# Patient Record
Sex: MALE | Race: WHITE
[De-identification: names, ages, dates, MRNs, and addresses within clinical notes are randomized per-mention and may not be internally consistent; named-entity substitution may affect disease eponyms.]

---

## 2017-01-03 ENCOUNTER — HOSPITAL ENCOUNTER (EMERGENCY)
Age: 47
Discharge: HOME | End: 2017-01-03
Payer: MEDICAID

## 2017-01-03 DIAGNOSIS — H66.003: ICD-10-CM

## 2017-01-03 DIAGNOSIS — J10.1: Primary | ICD-10-CM

## 2017-01-03 DIAGNOSIS — F17.200: ICD-10-CM

## 2017-01-03 DIAGNOSIS — I10: ICD-10-CM

## 2017-01-03 PROCEDURE — 87275 INFLUENZA B AG IF: CPT

## 2017-01-03 PROCEDURE — 99283 EMERGENCY DEPT VISIT LOW MDM: CPT

## 2017-01-03 PROCEDURE — 87276 INFLUENZA A AG IF: CPT

## 2017-01-03 PROCEDURE — 99284 EMERGENCY DEPT VISIT MOD MDM: CPT

## 2017-01-03 PROCEDURE — 71020: CPT

## 2017-01-03 PROCEDURE — 94640 AIRWAY INHALATION TREATMENT: CPT

## 2017-01-04 ENCOUNTER — HOSPITAL ENCOUNTER (EMERGENCY)
Age: 47
Discharge: HOME | End: 2017-01-04
Payer: MEDICAID

## 2017-01-04 DIAGNOSIS — Z87.442: ICD-10-CM

## 2017-01-04 DIAGNOSIS — I10: ICD-10-CM

## 2017-01-04 DIAGNOSIS — F17.200: ICD-10-CM

## 2017-01-04 DIAGNOSIS — K21.9: ICD-10-CM

## 2017-01-04 DIAGNOSIS — E86.0: Primary | ICD-10-CM

## 2017-01-04 DIAGNOSIS — J10.1: ICD-10-CM

## 2017-01-04 DIAGNOSIS — G57.72: ICD-10-CM

## 2017-01-04 PROCEDURE — 96374 THER/PROPH/DIAG INJ IV PUSH: CPT

## 2017-01-04 PROCEDURE — 99284 EMERGENCY DEPT VISIT MOD MDM: CPT

## 2017-01-04 PROCEDURE — 96376 TX/PRO/DX INJ SAME DRUG ADON: CPT

## 2017-01-04 PROCEDURE — 96375 TX/PRO/DX INJ NEW DRUG ADDON: CPT

## 2017-01-04 PROCEDURE — 99283 EMERGENCY DEPT VISIT LOW MDM: CPT

## 2017-01-31 ENCOUNTER — HOSPITAL ENCOUNTER (EMERGENCY)
Age: 47
Discharge: HOME | End: 2017-01-31
Payer: MEDICAID

## 2017-01-31 DIAGNOSIS — I10: ICD-10-CM

## 2017-01-31 DIAGNOSIS — F17.200: ICD-10-CM

## 2017-01-31 DIAGNOSIS — G90.522: Primary | ICD-10-CM

## 2017-01-31 PROCEDURE — 99283 EMERGENCY DEPT VISIT LOW MDM: CPT

## 2017-01-31 PROCEDURE — 96376 TX/PRO/DX INJ SAME DRUG ADON: CPT

## 2017-01-31 PROCEDURE — 96374 THER/PROPH/DIAG INJ IV PUSH: CPT

## 2017-01-31 PROCEDURE — 96375 TX/PRO/DX INJ NEW DRUG ADDON: CPT

## 2017-01-31 PROCEDURE — 99284 EMERGENCY DEPT VISIT MOD MDM: CPT

## 2017-02-01 ENCOUNTER — HOSPITAL ENCOUNTER (EMERGENCY)
Age: 47
Discharge: HOME | End: 2017-02-01
Payer: MEDICAID

## 2017-02-01 DIAGNOSIS — F17.200: ICD-10-CM

## 2017-02-01 DIAGNOSIS — G90.50: Primary | ICD-10-CM

## 2017-02-01 DIAGNOSIS — I10: ICD-10-CM

## 2017-02-01 PROCEDURE — 99283 EMERGENCY DEPT VISIT LOW MDM: CPT

## 2017-02-01 PROCEDURE — 99282 EMERGENCY DEPT VISIT SF MDM: CPT

## 2017-02-04 ENCOUNTER — HOSPITAL ENCOUNTER (EMERGENCY)
Age: 47
Discharge: HOME | End: 2017-02-04
Payer: MEDICAID

## 2017-05-03 ENCOUNTER — HOSPITAL ENCOUNTER (EMERGENCY)
Dept: HOSPITAL 21 - SED | Age: 47
LOS: 1 days | Discharge: HOME | End: 2017-05-04
Payer: COMMERCIAL

## 2017-05-03 VITALS
RESPIRATION RATE: 20 BRPM | DIASTOLIC BLOOD PRESSURE: 106 MMHG | OXYGEN SATURATION: 100 % | SYSTOLIC BLOOD PRESSURE: 176 MMHG | HEART RATE: 85 BPM

## 2017-05-03 VITALS
SYSTOLIC BLOOD PRESSURE: 157 MMHG | DIASTOLIC BLOOD PRESSURE: 90 MMHG | RESPIRATION RATE: 16 BRPM | HEART RATE: 85 BPM | OXYGEN SATURATION: 98 %

## 2017-05-03 VITALS — BODY MASS INDEX: 37.88 KG/M2 | HEIGHT: 71 IN | WEIGHT: 270.55 LBS

## 2017-05-03 DIAGNOSIS — R07.89: Primary | ICD-10-CM

## 2017-05-03 DIAGNOSIS — R61: ICD-10-CM

## 2017-05-03 DIAGNOSIS — Z88.8: ICD-10-CM

## 2017-05-03 DIAGNOSIS — F32.9: ICD-10-CM

## 2017-05-03 DIAGNOSIS — R42: ICD-10-CM

## 2017-05-03 DIAGNOSIS — I10: ICD-10-CM

## 2017-05-03 DIAGNOSIS — K21.9: ICD-10-CM

## 2017-05-03 DIAGNOSIS — R06.02: ICD-10-CM

## 2017-05-03 LAB
BASOPHILS % (AUTO): 0.2 % (ref 0–3)
EOSINOPHILS % (AUTO): 3.3 % (ref 0–5)
MAGNESIUM: 2 MG/DL (ref 1.6–2.6)
MCH RBC QN AUTO: 30.4 PG (ref 27–35)
MEAN CORPUSCULAR VOLUME: 85.9 FL (ref 81–100)
MONOCYTES % (AUTO): 9.2 % (ref 4–12)
NEUTROPHILS NFR BLD AUTO: 48.4 % (ref 40–74)
PLATELET COUNT: 173 BIL/L (ref 150–400)
TROPONIN T SERPL-MCNC: 0.01 UG/L (ref 0–0.01)

## 2017-05-03 PROCEDURE — 83735 ASSAY OF MAGNESIUM: CPT

## 2017-05-03 PROCEDURE — 80053 COMPREHEN METABOLIC PANEL: CPT

## 2017-05-03 PROCEDURE — 84484 ASSAY OF TROPONIN QUANT: CPT

## 2017-05-03 PROCEDURE — 85025 COMPLETE CBC W/AUTO DIFF WBC: CPT

## 2017-05-03 PROCEDURE — 36415 COLL VENOUS BLD VENIPUNCTURE: CPT

## 2017-05-03 PROCEDURE — 99285 EMERGENCY DEPT VISIT HI MDM: CPT

## 2017-05-03 PROCEDURE — 71010: CPT

## 2017-05-03 PROCEDURE — 96376 TX/PRO/DX INJ SAME DRUG ADON: CPT

## 2017-05-03 PROCEDURE — 85378 FIBRIN DEGRADE SEMIQUANT: CPT

## 2017-05-03 PROCEDURE — 93005 ELECTROCARDIOGRAM TRACING: CPT

## 2017-05-03 PROCEDURE — 96374 THER/PROPH/DIAG INJ IV PUSH: CPT

## 2017-05-03 NOTE — ED.REPORT
HPI-Chest Pain 40 and Over


Date of Service


May 3, 2017


 ED Provider: Bebo Zavala MD


Pt is a 46 y.o. male with a hx of complex regional pain syndrome and HTN who 

presents to the ED c/o left-sided chest pain and pressure onset 1 hour prior to 

arrival. Pt reports associated diaphoresis, dizziness, and SOB. He states that 

he felt his left Achilles tendon "pop" and that is what proceeded the chest 

pain. Pt reports taking 5mg Valium 4-5 hours prior to arrival. Pt has a hx of 

cardiac catheterization but has no hx of MI.


Nursing Notes


Stated Complaint:  CHEST PAIN


Chief Complaint:  Chest Pain


Nursing Notes Reviewed:  Yes


Allergies:  


Coded Allergies:  


     NSAIDS (Non-Steroidal Anti-Inflamma (Verified  Allergy, Unknown, 5/3/17)


     diphenhydramine (Verified  Allergy, Unknown, 5/3/17)


     gabapentin (Verified  Allergy, Unknown, 5/3/17)


Scheduled


Levetiracetam (Keppra) 500 Mg Tablet 500 MG PO BID 


Omeprazole (Omeprazole) 20 Mg Tablet.dr 20 MG PO BID 





Miscellaneous Medications


([Lisinopril])   


([Buspirone])   


([Amitriptyline])   





General


Time Seen by MD:  21:14





Chief Complaint


Chest pain, Chest pressure


Hx Obtained From:  Patient


Arrived By:  Walk-in


Sudden in Onset?:  Yes


Onset Occurred:  1 - 4 hours ago


Symptom Duration:  Since onset


Location:  : Chest left


Quality:  Painful, Pressure


Radiation:  : Does not radiate


Severity: Current:  Moderate


Severity: Maximum:  Severe


Similar Sx Previous:  Yes





Past Medical History


Past Medical History





complex regional pain sydrome


Head injury 2001


Reports: GERD, Hypertension


Reports: Depression, Ureterolithiasis





Past Surgical History





Cardiac cath


Reports: Back/neck surgery





Smoking History


Unknown if Ever Smoker





Social History


Other Social History:  Good social support





Ambulatory Status


Independent





Review of Systems


Respiratory:  Reports: Shortness of breath


Cardiovascular:  Reports: Chest pain


Musculoskeletal:  Reports: Extremity pain (Left lower extremity)


Skin:  Reports Diaphoresis


Neurologic:  Reports: Dizziness


Complete sys rev & neg:  except as marked.





Physical Exam


Initial Vital Signs





 Vital Signs (First)








  Date Time  Temp Pulse Resp B/P Pulse Ox O2 Delivery O2 Flow Rate FiO2


 


5/3/17 20:53 36.0 85 20 176/106 100 Room Air  








Initial VS:  Reviewed, Vital signs abnormal


    Head / Eyes:  Atraumatic, Normocephalic


    Extremities:  Vascular intact, Neuro intact


    Skin:  Warm, Dry, No cyanosis


    Neurologic:  Alert, Oriented, Nonfocal


    Psychiatric:  Mood/affect normal, Behavior normal, Normal thought content


General/Constitutional:  Awake, Alert, Well appearing, Well developed, Well 

hydrated, Well nourished, Not toxic appearing


Behavior:  Positive: Anxious





Pt appears shaky


Respiratory / Chest:  Atraumatic, Breath sounds NL, Breath sounds = bilat, No 

respiratory distress


Cardiovascular:  Heart rate NL, Regular rhythm, Heart sounds NL, Peripheral 

circulation NL





No lower extremity edema


Abdomen:  Atraumatic, Soft, Non-tender, No guarding, No rebound, No distention


Neck:  Atraumatic, Supple, No JVD


Lower Extremity / Pelvis / MS:  Atraumatic, No deformity, Neurologic intact, 

Vascular intact


Left Leg / Calf:  Positive: Tenderness present... (over Achilles), 


   Negative: Ecchymosis present, Erythema present, Swelling present..., Warmth 

present





Scarring over distal Achilles, no acute changes


Atrophy of left calf vs. right


Skin:  Atraumatic, Color NL, No rash, Warm, Intact


Color / Condition:  Positive: Diaphoresis present





Interpretation & Diagnostics


Lab Results Interpretation


Result Diagram:  


5/3/17 2224                                                                    

            5/3/17 2150














Test


  5/3/17


21:50 5/3/17


22:24 5/3/17


22:25 5/4/17


00:55


 


Sodium Level


  142mEq/L


(134-144) 


  


  


 


 


Potassium Level


  4.2mEq/L


(3.5-5.2) 


  


  


 


 


Chloride Level


  103mEq/L


() 


  


  


 


 


Carbon Dioxide Level


  24mmol/L


(18-29) 


  


  


 


 


Blood Urea Nitrogen 7mg/dL (6-24)    


 


Creatinine


  0.72mg/dL


(0.76-1.27) 


  


  


 


 


Estimat Glomerular Filtration


Rate 124mL/min


(>59) 


  


  


 


 


Glucose Level


  187mg/dL


(60-99) 


  


  


 


 


Calcium Level


  9.0mg/dL


(8.5-10.1) 


  


  


 


 


Magnesium Level


  2.0mg/dL


(1.6-2.6) 


  


  


 


 


Total Bilirubin


  0.2mg/dL


(0.0-1.2) 


  


  


 


 


Aspartate Amino Transf


(AST/SGOT) 40U/L (0-50) 


  


  


  


 


 


Alanine Aminotransferase


(ALT/SGPT) 59U/L (0-44) 


  


  


  


 


 


Alkaline Phosphatase


  102U/L


() 


  


  


 


 


Total Protein


  6.7g/dL


(6.4-8.4) 


  


  


 


 


Albumin


  4.3g/dL


(3.4-5.0) 


  


  


 


 


White Blood Count


  


  6.0th/mm3


(3.8-10.1) 


  


 


 


Red Blood Count


  


  5.26mil/mm3


(4.40-5.80) 


  


 


 


Hemoglobin


  


  16.0g/dL


(13.8-17.2) 


  


 


 


Hematocrit


  


  45.2%


(41.0-50.0) 


  


 


 


Mean Corpuscular Volume


  


  85.9fL


() 


  


 


 


Mean Corpuscular Hemoglobin


  


  30.4pg


(27.0-35.0) 


  


 


 


Mean Corpuscular Hemoglobin


Concent 


  35.4%


(32.0-37.0) 


  


 


 


Red Cell Distribution Width


  


  12.5%


(12.3-15.4) 


  


 


 


Platelet Count


  


  173bil/L


(150-400) 


  


 


 


Neutrophils (%) (Auto)  48.4% (40-74)   


 


Lymphocytes (%) (Auto)  38.7% (14-46)   


 


Monocytes (%) (Auto)  9.2% (4-12)   


 


Eosinophils (%) (Auto)  3.3% (0-5)   


 


Basophils (%) (Auto)  0.2% (0-3)   


 


D-Dimer


  


  


  < 0.50mg/L FEU


(<0.50) 


 


 


Hold Blue Top Tube


  


  


  Received


(Received) 


 


 


Hold Tiger Top Tube


  


  


  Received


(Received) 


 


 


Hold Grey Top Tube


  


  


  Received


(Received) 


 


 


Troponin T


  


  


  


  0.010ug/L


(0.0-0.011)








Lab values outside NL range:  no clinical significance.


Lab Results Interpretation:  


Troponin negative 2, d-dimer negative





ECG Interpretation





   ECG Interpretation:  


LVH


   Time:  21:05


   Interpreted by:  ED physician


   Normal ECG Interpretation:  Normal rate (85), Normal sinus rhythm, No change 

from prior ECGs (7/28/16)








   Time:  22:41


   Interpreted by:  ED physician


   Normal ECG Interpretation:  Normal rate (76), Normal sinus rhythm


   Repeat ECG:  Repeat ECG unchanged





X-Ray Chest Interpretation





   Chest Xray Interpretation:  


IMPRESSION: No acute cardiopulmonary disease


   Interpretation / Wet Read by:  Wet read ED physician





Re-Eval/Medical Decision


Med Decision/Clinical Course


46-year-old male with chest pain and a significant anxiety response to it.  

Emergency room evaluation to include negative troponin 2, negative EKG 2, 

negative d-dimer, and negative chest x-ray.  He does not appear to have serious 

disease at this time.  He is much improved, including no further chest pain and 

no anxiety response.


Source of Hx:  Old records





   Time of Eval:  22:38


   Re-Evaluation/Progress Note:  


Pt is reporting that his chest pressure and diaphoresis has worsened.








   Time of Eval:  22:45


   Re-Evaluation/Progress Note:  


Pt rechecked. He states his sx do not feel like anxiety.


He reports that the [place] stopped the chest pain immediately but has


since returned.


Discussed need for D-dimer, pt understands and agrees with plan.








   Time of Eval:  00:48


   Re-Evaluation/Progress Note:  


Pt rechecked. Pt feels improved.


Discussed need for repeat troponin, pt understands and agrees with plan.


Counseled Regarding:  Diagnosis, Lab results, Need for follow-up, When/why to 

return to ED





Discharge & Departure





 Primary Impression:  


 Chest pain with low risk for cardiac etiology


Disposition:  Home


Discharge Condition


All VS Reviewed:  Yes


Condition:  Improved


Patient Instructions:  Chest Pain (ED)





Additional Instructions:


There is no evidence of serious heart or lung disease at this time.  Chest x-

ray is normal, EKG is normal 2, troponin heart enzyme test is negative 2, d-

dimer lung blood clot test is normal, and all the other labs look okay.  I 

think at least part of your symptoms were due to anxiety.  I think it safe for 

you to go home.  Follow-up with your regular doctor as needed.  Return here if 

you worsen or call me at [phone] between 9 PM and 6 AM for the next couple 

nights if you have any questions.


Referrals:  


Hue Aguila DO (PCP)


Camelia Attestation


Portions of this note were transcribed by Zach Sylvester. I, Dr. Zavala 

personally performed the history, physical exam and medical decision-making; I 

reviewed and confirmed the accuracy of the information in the transcribed note. 

Signed by: Camelia Naidu, 05/04/17 and 0145.


copies to:   Hue Aguila Howard L MD May 3, 2017 21:24


ZACH SYLVESTER May 3, 2017 21:27

## 2017-05-04 VITALS
RESPIRATION RATE: 25 BRPM | HEART RATE: 66 BPM | SYSTOLIC BLOOD PRESSURE: 157 MMHG | DIASTOLIC BLOOD PRESSURE: 96 MMHG | OXYGEN SATURATION: 96 %

## 2017-05-04 NOTE — DRSVH
PROCEDURE:  X-RAY CHEST ONE VIEW, PORTABLE ([id number])

 

INDICATIONS:  CP

 

TECHNIQUE:  One view of the chest was acquired.  

 

COMPARISON:  [facility], CR, XR CHEST 1VW (PORTABLE), 7/28/2016, 1:00.

 

FINDINGS:  

 

Surgical changes and devices:  None.  

 

Lungs and pleura:  No pleural effusions or pneumothorax.  Lungs are clear.  

 

Mediastinum:  Mediastinal contours appear normal.  Heart size is normal.  

 

Bones and chest wall:  No suspicious bony lesions.  Overlying soft tissues appear unremarkable.  

 

IMPRESSION:  No acute cardiopulmonary disease.  

 

 

 

Dictated by:  Karthik Jolly [facility] Interpreted: Emre Liao MD on 5/04/2017 at 8:51   

Transcribed by:  GERTRUDIS on 5/04/2017 at 8:51    

Approved by:  Emre Liao M.D. on 5/04/2017 at 9:42

## 2017-05-05 ENCOUNTER — HOSPITAL ENCOUNTER (EMERGENCY)
Dept: HOSPITAL 21 - SED | Age: 47
Discharge: HOME | End: 2017-05-05
Payer: COMMERCIAL

## 2017-05-05 ENCOUNTER — HOSPITAL ENCOUNTER (EMERGENCY)
Dept: HOSPITAL 76 - ED | Age: 47
Discharge: HOME | End: 2017-05-05
Payer: MEDICAID

## 2017-05-05 VITALS
RESPIRATION RATE: 16 BRPM | OXYGEN SATURATION: 98 % | DIASTOLIC BLOOD PRESSURE: 96 MMHG | SYSTOLIC BLOOD PRESSURE: 153 MMHG | HEART RATE: 99 BPM

## 2017-05-05 VITALS — WEIGHT: 270.57 LBS | HEIGHT: 71 IN | BODY MASS INDEX: 37.88 KG/M2

## 2017-05-05 VITALS — DIASTOLIC BLOOD PRESSURE: 100 MMHG | SYSTOLIC BLOOD PRESSURE: 167 MMHG

## 2017-05-05 DIAGNOSIS — Z88.8: ICD-10-CM

## 2017-05-05 DIAGNOSIS — M79.672: ICD-10-CM

## 2017-05-05 DIAGNOSIS — E78.5: ICD-10-CM
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
## 2017-05-05 DIAGNOSIS — I10: ICD-10-CM

## 2017-05-05 DIAGNOSIS — Z87.442: ICD-10-CM

## 2017-05-05 DIAGNOSIS — K21.9: ICD-10-CM
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
## 2017-05-05 DIAGNOSIS — S86.092A: Primary | ICD-10-CM
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
## 2017-05-05 DIAGNOSIS — F17.200: ICD-10-CM
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
## 2017-05-05 DIAGNOSIS — M76.62: Primary | ICD-10-CM
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
## 2017-05-05 PROCEDURE — 99283 EMERGENCY DEPT VISIT LOW MDM: CPT

## 2017-05-05 PROCEDURE — 99282 EMERGENCY DEPT VISIT SF MDM: CPT
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
## 2017-09-19 ENCOUNTER — HOSPITAL ENCOUNTER (EMERGENCY)
Dept: HOSPITAL 76 - ED | Age: 47
Discharge: HOME | End: 2017-09-19
Payer: MEDICAID

## 2017-09-19 VITALS — DIASTOLIC BLOOD PRESSURE: 79 MMHG | SYSTOLIC BLOOD PRESSURE: 142 MMHG
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
## 2017-09-19 DIAGNOSIS — R07.9: Primary | ICD-10-CM
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
## 2017-09-19 DIAGNOSIS — Z87.891: ICD-10-CM

## 2017-09-19 DIAGNOSIS — M54.5: ICD-10-CM
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
## 2017-09-19 PROCEDURE — 99283 EMERGENCY DEPT VISIT LOW MDM: CPT

## 2017-09-19 PROCEDURE — 93005 ELECTROCARDIOGRAM TRACING: CPT

## 2017-09-19 PROCEDURE — 72100 X-RAY EXAM L-S SPINE 2/3 VWS: CPT

## 2017-09-19 PROCEDURE — 71020: CPT

## 2017-11-30 ENCOUNTER — HOSPITAL ENCOUNTER (OUTPATIENT)
Dept: HOSPITAL 76 - EMS | Age: 47
End: 2017-11-30
Attending: SURGERY
Payer: MEDICAID

## 2017-11-30 DIAGNOSIS — Z03.89: Primary | ICD-10-CM

## 2018-02-13 ENCOUNTER — HOSPITAL ENCOUNTER (OUTPATIENT)
Dept: HOSPITAL 76 - EMS | Age: 48
Discharge: ANOTHER HOSPITAL | End: 2018-02-13
Attending: SURGERY
Payer: MEDICAID

## 2018-02-13 ENCOUNTER — HOSPITAL ENCOUNTER (EMERGENCY)
Dept: HOSPITAL 76 - ED | Age: 48
Discharge: HOME | End: 2018-02-13
Payer: MEDICAID

## 2018-02-13 VITALS — SYSTOLIC BLOOD PRESSURE: 158 MMHG | DIASTOLIC BLOOD PRESSURE: 97 MMHG

## 2018-02-13 DIAGNOSIS — M54.5: Primary | ICD-10-CM
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
## 2018-02-13 DIAGNOSIS — Z87.891: ICD-10-CM

## 2018-02-13 DIAGNOSIS — M25.512: ICD-10-CM

## 2018-02-13 DIAGNOSIS — I10: ICD-10-CM
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
## 2018-02-13 DIAGNOSIS — S70.02XA: ICD-10-CM

## 2018-02-13 DIAGNOSIS — S20.212A: Primary | ICD-10-CM
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
## 2018-02-13 DIAGNOSIS — K21.9: ICD-10-CM
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
## 2018-02-13 DIAGNOSIS — Z87.442: ICD-10-CM

## 2018-02-13 PROCEDURE — 99283 EMERGENCY DEPT VISIT LOW MDM: CPT

## 2018-02-13 PROCEDURE — 71046 X-RAY EXAM CHEST 2 VIEWS: CPT

## 2018-02-20 ENCOUNTER — HOSPITAL ENCOUNTER (EMERGENCY)
Dept: HOSPITAL 76 - ED | Age: 48
Discharge: HOME | End: 2018-02-20
Payer: MEDICAID

## 2018-02-20 VITALS — DIASTOLIC BLOOD PRESSURE: 93 MMHG | SYSTOLIC BLOOD PRESSURE: 131 MMHG

## 2018-02-20 DIAGNOSIS — M54.30: Primary | ICD-10-CM

## 2018-02-20 DIAGNOSIS — I10: ICD-10-CM

## 2018-02-20 PROCEDURE — 99283 EMERGENCY DEPT VISIT LOW MDM: CPT

## 2018-02-20 PROCEDURE — 72131 CT LUMBAR SPINE W/O DYE: CPT

## 2018-04-17 ENCOUNTER — HOSPITAL ENCOUNTER (OUTPATIENT)
Dept: HOSPITAL 76 - EMS | Age: 48
Discharge: ANOTHER HOSPITAL | End: 2018-04-17
Attending: SURGERY
Payer: MEDICAID

## 2018-04-17 ENCOUNTER — HOSPITAL ENCOUNTER (EMERGENCY)
Dept: HOSPITAL 76 - ED | Age: 48
Discharge: ANOTHER HOSPITAL | End: 2018-04-17
Payer: MEDICAID

## 2018-04-17 VITALS — SYSTOLIC BLOOD PRESSURE: 138 MMHG | DIASTOLIC BLOOD PRESSURE: 87 MMHG

## 2018-04-17 DIAGNOSIS — R15.9: Primary | ICD-10-CM

## 2018-04-17 DIAGNOSIS — R32: ICD-10-CM

## 2018-04-17 DIAGNOSIS — G83.4: Primary | ICD-10-CM

## 2018-04-17 DIAGNOSIS — I10: ICD-10-CM

## 2018-04-17 DIAGNOSIS — M54.5: ICD-10-CM

## 2018-04-17 LAB
ALBUMIN DIAFP-MCNC: 4.6 G/DL (ref 3.2–5.5)
ALBUMIN/GLOB SERPL: 1.6 {RATIO} (ref 1–2.2)
ALP SERPL-CCNC: 61 IU/L (ref 42–121)
ALT SERPL W P-5'-P-CCNC: 31 IU/L (ref 10–60)
ANION GAP SERPL CALCULATED.4IONS-SCNC: 7 MMOL/L (ref 6–13)
AST SERPL W P-5'-P-CCNC: 25 IU/L (ref 10–42)
BASOPHILS NFR BLD AUTO: 0 10^3/UL (ref 0–0.1)
BASOPHILS NFR BLD AUTO: 0.3 %
BILIRUB BLD-MCNC: 0.7 MG/DL (ref 0.2–1)
BUN SERPL-MCNC: 9 MG/DL (ref 6–20)
CALCIUM UR-MCNC: 9.1 MG/DL (ref 8.5–10.3)
CHLORIDE SERPL-SCNC: 104 MMOL/L (ref 101–111)
CO2 SERPL-SCNC: 24 MMOL/L (ref 21–32)
CREAT SERPLBLD-SCNC: 0.7 MG/DL (ref 0.6–1.2)
EOSINOPHIL # BLD AUTO: 0 10^3/UL (ref 0–0.7)
EOSINOPHIL NFR BLD AUTO: 0.2 %
ERYTHROCYTE [DISTWIDTH] IN BLOOD BY AUTOMATED COUNT: 12.8 % (ref 12–15)
GFRSERPLBLD MDRD-ARVRAT: 120 ML/MIN/{1.73_M2} (ref 89–?)
GLOBULIN SER-MCNC: 2.9 G/DL (ref 2.1–4.2)
GLUCOSE SERPL-MCNC: 107 MG/DL (ref 70–100)
HGB UR QL STRIP: 15.9 G/DL (ref 14–18)
LIPASE SERPL-CCNC: 20 U/L (ref 22–51)
LYMPHOCYTES # SPEC AUTO: 1.8 10^3/UL (ref 1.5–3.5)
LYMPHOCYTES NFR BLD AUTO: 18.5 %
MCH RBC QN AUTO: 30.4 PG (ref 27–31)
MCHC RBC AUTO-ENTMCNC: 34.1 G/DL (ref 32–36)
MCV RBC AUTO: 89.3 FL (ref 80–94)
MONOCYTES # BLD AUTO: 0.4 10^3/UL (ref 0–1)
MONOCYTES NFR BLD AUTO: 4.4 %
NEUTROPHILS # BLD AUTO: 7.5 10^3/UL (ref 1.5–6.6)
NEUTROPHILS # SNV AUTO: 9.8 X10^3/UL (ref 4.8–10.8)
NEUTROPHILS NFR BLD AUTO: 76.6 %
PDW BLD AUTO: 8.1 FL (ref 7.4–11.4)
PLATELET # BLD: 183 10^3/UL (ref 130–450)
PROT SPEC-MCNC: 7.5 G/DL (ref 6.7–8.2)
RBC MAR: 5.24 10^6/UL (ref 4.7–6.1)
SODIUM SERPLBLD-SCNC: 135 MMOL/L (ref 135–145)

## 2018-04-17 PROCEDURE — 99283 EMERGENCY DEPT VISIT LOW MDM: CPT

## 2018-04-17 PROCEDURE — 83690 ASSAY OF LIPASE: CPT

## 2018-04-17 PROCEDURE — 96361 HYDRATE IV INFUSION ADD-ON: CPT

## 2018-04-17 PROCEDURE — 80053 COMPREHEN METABOLIC PANEL: CPT

## 2018-04-17 PROCEDURE — 36415 COLL VENOUS BLD VENIPUNCTURE: CPT

## 2018-04-17 PROCEDURE — 96375 TX/PRO/DX INJ NEW DRUG ADDON: CPT

## 2018-04-17 PROCEDURE — 85025 COMPLETE CBC W/AUTO DIFF WBC: CPT

## 2018-04-17 PROCEDURE — 99284 EMERGENCY DEPT VISIT MOD MDM: CPT

## 2018-04-17 PROCEDURE — 96374 THER/PROPH/DIAG INJ IV PUSH: CPT

## 2018-04-17 PROCEDURE — 51798 US URINE CAPACITY MEASURE: CPT

## 2018-05-13 ENCOUNTER — HOSPITAL ENCOUNTER (EMERGENCY)
Dept: HOSPITAL 76 - ED | Age: 48
Discharge: HOME | End: 2018-05-13
Payer: MEDICAID

## 2018-05-13 VITALS — SYSTOLIC BLOOD PRESSURE: 133 MMHG | DIASTOLIC BLOOD PRESSURE: 93 MMHG

## 2018-05-13 DIAGNOSIS — I10: ICD-10-CM

## 2018-05-13 DIAGNOSIS — E86.0: ICD-10-CM

## 2018-05-13 DIAGNOSIS — G90.50: ICD-10-CM

## 2018-05-13 DIAGNOSIS — J06.9: ICD-10-CM

## 2018-05-13 DIAGNOSIS — H66.002: Primary | ICD-10-CM

## 2018-05-13 LAB
ALBUMIN DIAFP-MCNC: 4.2 G/DL (ref 3.2–5.5)
ALBUMIN/GLOB SERPL: 1.4 {RATIO} (ref 1–2.2)
ALP SERPL-CCNC: 62 IU/L (ref 42–121)
ALT SERPL W P-5'-P-CCNC: 30 IU/L (ref 10–60)
ANION GAP SERPL CALCULATED.4IONS-SCNC: 7 MMOL/L (ref 6–13)
AST SERPL W P-5'-P-CCNC: 29 IU/L (ref 10–42)
BASOPHILS NFR BLD AUTO: 0 10^3/UL (ref 0–0.1)
BASOPHILS NFR BLD AUTO: 0.2 %
BILIRUB BLD-MCNC: 0.9 MG/DL (ref 0.2–1)
BUN SERPL-MCNC: 14 MG/DL (ref 6–20)
CALCIUM UR-MCNC: 8.9 MG/DL (ref 8.5–10.3)
CHLORIDE SERPL-SCNC: 101 MMOL/L (ref 101–111)
CLARITY UR REFRACT.AUTO: CLEAR
CO2 SERPL-SCNC: 25 MMOL/L (ref 21–32)
CREAT SERPLBLD-SCNC: 0.8 MG/DL (ref 0.6–1.2)
EOSINOPHIL # BLD AUTO: 0.1 10^3/UL (ref 0–0.7)
EOSINOPHIL NFR BLD AUTO: 2.2 %
ERYTHROCYTE [DISTWIDTH] IN BLOOD BY AUTOMATED COUNT: 12.5 % (ref 12–15)
GFRSERPLBLD MDRD-ARVRAT: 103 ML/MIN/{1.73_M2} (ref 89–?)
GLOBULIN SER-MCNC: 3.1 G/DL (ref 2.1–4.2)
GLUCOSE SERPL-MCNC: 99 MG/DL (ref 70–100)
GLUCOSE UR QL STRIP.AUTO: NEGATIVE MG/DL
HGB UR QL STRIP: 16.3 G/DL (ref 14–18)
KETONES UR QL STRIP.AUTO: NEGATIVE MG/DL
LIPASE SERPL-CCNC: 32 U/L (ref 22–51)
LYMPHOCYTES # SPEC AUTO: 0.5 10^3/UL (ref 1.5–3.5)
LYMPHOCYTES NFR BLD AUTO: 10.1 %
MCH RBC QN AUTO: 30.8 PG (ref 27–31)
MCHC RBC AUTO-ENTMCNC: 34.3 G/DL (ref 32–36)
MCV RBC AUTO: 89.6 FL (ref 80–94)
MONOCYTES # BLD AUTO: 0.6 10^3/UL (ref 0–1)
MONOCYTES NFR BLD AUTO: 11.6 %
NEUTROPHILS # BLD AUTO: 4 10^3/UL (ref 1.5–6.6)
NEUTROPHILS # SNV AUTO: 5.2 X10^3/UL (ref 4.8–10.8)
NEUTROPHILS NFR BLD AUTO: 75.9 %
NITRITE UR QL STRIP.AUTO: NEGATIVE
PDW BLD AUTO: 8.5 FL (ref 7.4–11.4)
PH UR STRIP.AUTO: 7 PH (ref 5–7.5)
PLATELET # BLD: 133 10^3/UL (ref 130–450)
PROT SPEC-MCNC: 7.3 G/DL (ref 6.7–8.2)
PROT UR STRIP.AUTO-MCNC: NEGATIVE MG/DL
RBC # UR STRIP.AUTO: NEGATIVE /UL
RBC MAR: 5.3 10^6/UL (ref 4.7–6.1)
SODIUM SERPLBLD-SCNC: 133 MMOL/L (ref 135–145)
SP GR UR STRIP.AUTO: 1.01 (ref 1–1.03)
UROBILINOGEN UR QL STRIP.AUTO: (no result) E.U./DL
UROBILINOGEN UR STRIP.AUTO-MCNC: NEGATIVE MG/DL

## 2018-05-13 PROCEDURE — 81001 URINALYSIS AUTO W/SCOPE: CPT

## 2018-05-13 PROCEDURE — 81003 URINALYSIS AUTO W/O SCOPE: CPT

## 2018-05-13 PROCEDURE — 99284 EMERGENCY DEPT VISIT MOD MDM: CPT

## 2018-05-13 PROCEDURE — 96375 TX/PRO/DX INJ NEW DRUG ADDON: CPT

## 2018-05-13 PROCEDURE — 87086 URINE CULTURE/COLONY COUNT: CPT

## 2018-05-13 PROCEDURE — 96365 THER/PROPH/DIAG IV INF INIT: CPT

## 2018-05-13 PROCEDURE — 85025 COMPLETE CBC W/AUTO DIFF WBC: CPT

## 2018-05-13 PROCEDURE — 99283 EMERGENCY DEPT VISIT LOW MDM: CPT

## 2018-05-13 PROCEDURE — 80053 COMPREHEN METABOLIC PANEL: CPT

## 2018-05-13 PROCEDURE — 83690 ASSAY OF LIPASE: CPT

## 2018-05-13 PROCEDURE — 36415 COLL VENOUS BLD VENIPUNCTURE: CPT

## 2018-08-07 ENCOUNTER — HOSPITAL ENCOUNTER (EMERGENCY)
Dept: HOSPITAL 76 - ED | Age: 48
Discharge: LEFT BEFORE DISCHARGE | End: 2018-08-07
Payer: MEDICAID

## 2018-08-07 DIAGNOSIS — Z53.21: Primary | ICD-10-CM

## 2018-08-08 ENCOUNTER — HOSPITAL ENCOUNTER (EMERGENCY)
Dept: HOSPITAL 76 - ED | Age: 48
Discharge: HOME | End: 2018-08-08
Payer: MEDICAID

## 2018-08-08 VITALS — DIASTOLIC BLOOD PRESSURE: 74 MMHG | SYSTOLIC BLOOD PRESSURE: 128 MMHG

## 2018-08-08 DIAGNOSIS — F43.10: ICD-10-CM

## 2018-08-08 DIAGNOSIS — S86.811A: Primary | ICD-10-CM
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
## 2018-08-08 DIAGNOSIS — I10: ICD-10-CM

## 2018-08-08 PROCEDURE — 93971 EXTREMITY STUDY: CPT

## 2018-08-08 PROCEDURE — 99283 EMERGENCY DEPT VISIT LOW MDM: CPT

## 2019-01-03 ENCOUNTER — HOSPITAL ENCOUNTER (OUTPATIENT)
Dept: HOSPITAL 76 - DI | Age: 49
Discharge: HOME | End: 2019-01-03
Attending: INTERNAL MEDICINE
Payer: OTHER GOVERNMENT

## 2019-01-03 DIAGNOSIS — K76.0: Primary | ICD-10-CM

## 2019-01-03 PROCEDURE — 76705 ECHO EXAM OF ABDOMEN: CPT

## 2019-05-27 ENCOUNTER — HOSPITAL ENCOUNTER (EMERGENCY)
Dept: HOSPITAL 76 - ED | Age: 49
Discharge: HOME | End: 2019-05-27
Payer: OTHER GOVERNMENT

## 2019-05-27 VITALS — DIASTOLIC BLOOD PRESSURE: 95 MMHG | SYSTOLIC BLOOD PRESSURE: 151 MMHG

## 2019-05-27 DIAGNOSIS — G43.009: Primary | ICD-10-CM

## 2019-05-27 DIAGNOSIS — I10: ICD-10-CM

## 2019-05-27 PROCEDURE — 99283 EMERGENCY DEPT VISIT LOW MDM: CPT

## 2019-05-27 PROCEDURE — 96374 THER/PROPH/DIAG INJ IV PUSH: CPT

## 2019-05-27 PROCEDURE — 96361 HYDRATE IV INFUSION ADD-ON: CPT

## 2019-09-19 ENCOUNTER — HOSPITAL ENCOUNTER (OUTPATIENT)
Dept: HOSPITAL 76 - EMS | Age: 49
Discharge: ANOTHER HOSPITAL | End: 2019-09-19
Attending: SURGERY
Payer: OTHER GOVERNMENT

## 2019-09-19 DIAGNOSIS — M54.5: Primary | ICD-10-CM

## 2019-09-19 DIAGNOSIS — R32: ICD-10-CM

## 2020-12-23 ENCOUNTER — HOSPITAL ENCOUNTER (OUTPATIENT)
Dept: HOSPITAL 76 - DI | Age: 50
Discharge: HOME | End: 2020-12-23
Attending: NURSE PRACTITIONER
Payer: OTHER GOVERNMENT

## 2020-12-23 DIAGNOSIS — N50.3: Primary | ICD-10-CM

## 2020-12-23 DIAGNOSIS — N43.3: ICD-10-CM

## 2022-05-27 ENCOUNTER — HOSPITAL ENCOUNTER (OUTPATIENT)
Dept: HOSPITAL 76 - LAB.N | Age: 52
Discharge: HOME | End: 2022-05-27
Attending: FAMILY MEDICINE
Payer: OTHER GOVERNMENT

## 2022-05-27 DIAGNOSIS — I10: Primary | ICD-10-CM

## 2022-05-27 DIAGNOSIS — Z53.9: ICD-10-CM

## 2022-05-27 PROCEDURE — 85025 COMPLETE CBC W/AUTO DIFF WBC: CPT

## 2022-05-27 PROCEDURE — 84443 ASSAY THYROID STIM HORMONE: CPT

## 2022-05-27 PROCEDURE — 80053 COMPREHEN METABOLIC PANEL: CPT

## 2022-05-27 PROCEDURE — 83721 ASSAY OF BLOOD LIPOPROTEIN: CPT

## 2022-05-27 PROCEDURE — 36415 COLL VENOUS BLD VENIPUNCTURE: CPT

## 2022-05-27 PROCEDURE — 80061 LIPID PANEL: CPT

## 2022-06-26 ENCOUNTER — HOSPITAL ENCOUNTER (EMERGENCY)
Dept: HOSPITAL 76 - ED | Age: 52
Discharge: HOME | End: 2022-06-26
Payer: MEDICAID

## 2022-06-26 VITALS — DIASTOLIC BLOOD PRESSURE: 70 MMHG | SYSTOLIC BLOOD PRESSURE: 135 MMHG

## 2022-06-26 DIAGNOSIS — F17.290: ICD-10-CM

## 2022-06-26 DIAGNOSIS — I10: ICD-10-CM

## 2022-06-26 DIAGNOSIS — U07.1: Primary | ICD-10-CM

## 2022-06-26 LAB
ALBUMIN DIAFP-MCNC: 4 G/DL (ref 3.2–5.5)
ALBUMIN/GLOB SERPL: 1.3 {RATIO} (ref 1–2.2)
ALP SERPL-CCNC: 57 IU/L (ref 42–121)
ALT SERPL W P-5'-P-CCNC: 64 IU/L (ref 10–60)
ANION GAP SERPL CALCULATED.4IONS-SCNC: 11 MMOL/L (ref 6–13)
AST SERPL W P-5'-P-CCNC: 38 IU/L (ref 10–42)
B PARAPERT DNA SPEC QL NAA+PROBE: NOT DETECTED
B PERT DNA SPEC QL NAA+PROBE: NOT DETECTED
BASOPHILS NFR BLD AUTO: 0 10^3/UL (ref 0–0.1)
BASOPHILS NFR BLD AUTO: 0.1 %
BILIRUB BLD-MCNC: 0.4 MG/DL (ref 0.2–1)
BUN SERPL-MCNC: 12 MG/DL (ref 6–20)
C PNEUM DNA NPH QL NAA+NON-PROBE: NOT DETECTED
CALCIUM UR-MCNC: 9.2 MG/DL (ref 8.5–10.3)
CHLORIDE SERPL-SCNC: 98 MMOL/L (ref 101–111)
CO2 SERPL-SCNC: 27 MMOL/L (ref 21–32)
CREAT SERPLBLD-SCNC: 0.8 MG/DL (ref 0.6–1.2)
EOSINOPHIL # BLD AUTO: 0.1 10^3/UL (ref 0–0.7)
EOSINOPHIL NFR BLD AUTO: 1.9 %
ERYTHROCYTE [DISTWIDTH] IN BLOOD BY AUTOMATED COUNT: 12.2 % (ref 12–15)
FLUAV RNA RESP QL NAA+PROBE: NOT DETECTED
GFRSERPLBLD MDRD-ARVRAT: 101 ML/MIN/{1.73_M2} (ref 89–?)
GLOBULIN SER-MCNC: 3.2 G/DL (ref 2.1–4.2)
GLUCOSE SERPL-MCNC: 171 MG/DL (ref 70–100)
HAEM INFLU B DNA SPEC QL NAA+PROBE: NOT DETECTED
HCOV 229E RNA SPEC QL NAA+PROBE: NOT DETECTED
HCOV HKU1 RNA UPPER RESP QL NAA+PROBE: NOT DETECTED
HCOV NL63 RNA ASPIRATE QL NAA+PROBE: NOT DETECTED
HCOV OC43 RNA SPEC QL NAA+PROBE: NOT DETECTED
HCT VFR BLD AUTO: 44.1 % (ref 42–52)
HGB UR QL STRIP: 15 G/DL (ref 14–18)
HMPV AG SPEC QL: NOT DETECTED
HPIV1 RNA NPH QL NAA+PROBE: NOT DETECTED
HPIV2 SPEC QL CULT: NOT DETECTED
HPIV3 AB TITR SER CF: NOT DETECTED {TITER}
HPIV4 RNA SPEC QL NAA+PROBE: NOT DETECTED
LIPASE SERPL-CCNC: 27 U/L (ref 22–51)
LYMPHOCYTES # SPEC AUTO: 1.5 10^3/UL (ref 1.5–3.5)
LYMPHOCYTES NFR BLD AUTO: 20.6 %
M PNEUMO DNA SPEC QL NAA+PROBE: NOT DETECTED
MCH RBC QN AUTO: 29.8 PG (ref 27–31)
MCHC RBC AUTO-ENTMCNC: 34 G/DL (ref 32–36)
MCV RBC AUTO: 87.5 FL (ref 80–94)
MONOCYTES # BLD AUTO: 0.6 10^3/UL (ref 0–1)
MONOCYTES NFR BLD AUTO: 7.7 %
NEUTROPHILS # BLD AUTO: 5.2 10^3/UL (ref 1.5–6.6)
NEUTROPHILS # SNV AUTO: 7.5 X10^3/UL (ref 4.8–10.8)
NEUTROPHILS NFR BLD AUTO: 69.2 %
NRBC # BLD AUTO: 0 /100WBC
NRBC # BLD AUTO: 0 X10^3/UL
PDW BLD AUTO: 9.8 FL (ref 7.4–11.4)
PLATELET # BLD: 147 10^3/UL (ref 130–450)
POTASSIUM SERPL-SCNC: 3.6 MMOL/L (ref 3.5–5)
PROT SPEC-MCNC: 7.2 G/DL (ref 6.7–8.2)
RBC MAR: 5.04 10^6/UL (ref 4.7–6.1)
RSV RNA RESP QL NAA+PROBE: NOT DETECTED
RV+EV RNA SPEC QL NAA+PROBE: NOT DETECTED
SARS-COV-2 RNA PNL SPEC NAA+PROBE: DETECTED
SODIUM SERPLBLD-SCNC: 136 MMOL/L (ref 135–145)

## 2022-06-26 PROCEDURE — 71046 X-RAY EXAM CHEST 2 VIEWS: CPT

## 2022-06-26 PROCEDURE — 87633 RESP VIRUS 12-25 TARGETS: CPT

## 2022-06-26 PROCEDURE — 99284 EMERGENCY DEPT VISIT MOD MDM: CPT

## 2022-06-26 PROCEDURE — 80053 COMPREHEN METABOLIC PANEL: CPT

## 2022-06-26 PROCEDURE — 83690 ASSAY OF LIPASE: CPT

## 2022-06-26 PROCEDURE — 93005 ELECTROCARDIOGRAM TRACING: CPT

## 2022-06-26 PROCEDURE — 83880 ASSAY OF NATRIURETIC PEPTIDE: CPT

## 2022-06-26 PROCEDURE — 85025 COMPLETE CBC W/AUTO DIFF WBC: CPT

## 2022-06-26 PROCEDURE — 94640 AIRWAY INHALATION TREATMENT: CPT

## 2022-06-26 PROCEDURE — 84484 ASSAY OF TROPONIN QUANT: CPT

## 2022-06-26 PROCEDURE — 36415 COLL VENOUS BLD VENIPUNCTURE: CPT

## 2022-06-26 PROCEDURE — 99283 EMERGENCY DEPT VISIT LOW MDM: CPT

## 2022-07-02 ENCOUNTER — HOSPITAL ENCOUNTER (EMERGENCY)
Dept: HOSPITAL 76 - ED | Age: 52
Discharge: HOME | End: 2022-07-02
Payer: MEDICAID

## 2022-07-02 VITALS — DIASTOLIC BLOOD PRESSURE: 97 MMHG | SYSTOLIC BLOOD PRESSURE: 161 MMHG

## 2022-07-02 DIAGNOSIS — I10: ICD-10-CM

## 2022-07-02 DIAGNOSIS — U07.1: Primary | ICD-10-CM

## 2022-07-02 PROCEDURE — 99281 EMR DPT VST MAYX REQ PHY/QHP: CPT

## 2022-07-02 PROCEDURE — 99282 EMERGENCY DEPT VISIT SF MDM: CPT

## 2023-08-31 ENCOUNTER — HOSPITAL ENCOUNTER (EMERGENCY)
Dept: HOSPITAL 76 - ED | Age: 53
Discharge: HOME | End: 2023-08-31
Payer: MEDICAID

## 2023-08-31 VITALS — OXYGEN SATURATION: 99 % | DIASTOLIC BLOOD PRESSURE: 89 MMHG | SYSTOLIC BLOOD PRESSURE: 144 MMHG

## 2023-08-31 DIAGNOSIS — I10: ICD-10-CM

## 2023-08-31 DIAGNOSIS — G89.29: Primary | ICD-10-CM

## 2023-08-31 DIAGNOSIS — Z79.899: ICD-10-CM

## 2023-08-31 PROCEDURE — 99282 EMERGENCY DEPT VISIT SF MDM: CPT

## 2023-08-31 PROCEDURE — 99283 EMERGENCY DEPT VISIT LOW MDM: CPT
